# Patient Record
Sex: FEMALE | HISPANIC OR LATINO
[De-identification: names, ages, dates, MRNs, and addresses within clinical notes are randomized per-mention and may not be internally consistent; named-entity substitution may affect disease eponyms.]

---

## 2017-04-09 ENCOUNTER — HOSPITAL (OUTPATIENT)
Dept: OTHER | Age: 22
End: 2017-04-09
Attending: EMERGENCY MEDICINE

## 2017-09-05 ENCOUNTER — HOSPITAL (OUTPATIENT)
Dept: OTHER | Age: 22
End: 2017-09-05

## 2017-09-05 LAB
ALBUMIN SERPL-MCNC: 4.1 GM/DL (ref 3.6–5.1)
ALBUMIN/GLOB SERPL: 1.1 {RATIO} (ref 1–2.4)
ALP SERPL-CCNC: 88 UNIT/L (ref 45–117)
ALT SERPL-CCNC: 14 UNIT/L
ANALYZER ANC (IANC): NORMAL
ANION GAP SERPL CALC-SCNC: 9 MMOL/L (ref 10–20)
AST SERPL-CCNC: 18 UNIT/L
BASOPHILS # BLD: 0 THOUSAND/MCL (ref 0–0.3)
BASOPHILS NFR BLD: 0 %
BILIRUB SERPL-MCNC: 0.7 MG/DL (ref 0.2–1)
BUN SERPL-MCNC: 13 MG/DL (ref 6–20)
BUN/CREAT SERPL: 18 (ref 7–25)
CALCIUM SERPL-MCNC: 9.6 MG/DL (ref 8.4–10.2)
CHLORIDE: 105 MMOL/L (ref 98–107)
CO2 SERPL-SCNC: 29 MMOL/L (ref 21–32)
CREAT SERPL-MCNC: 0.71 MG/DL (ref 0.51–0.95)
DIFFERENTIAL METHOD BLD: NORMAL
EOSINOPHIL # BLD: 0.1 THOUSAND/MCL (ref 0.1–0.5)
EOSINOPHIL NFR BLD: 1 %
ERYTHROCYTE [DISTWIDTH] IN BLOOD: 12.5 % (ref 11–15)
GLOBULIN SER-MCNC: 3.8 GM/DL (ref 2–4)
GLUCOSE SERPL-MCNC: 84 MG/DL (ref 65–99)
HCG POINT OF CARE (5HGRST): NEGATIVE
HEMATOCRIT: 41.6 % (ref 36–46.5)
HGB BLD-MCNC: 14 GM/DL (ref 12–15.5)
LYMPHOCYTES # BLD: 2 THOUSAND/MCL (ref 1.2–5.2)
LYMPHOCYTES NFR BLD: 22 %
MCH RBC QN AUTO: 30.2 PG (ref 26–34)
MCHC RBC AUTO-ENTMCNC: 33.7 GM/DL (ref 32–36.5)
MCV RBC AUTO: 89.7 FL (ref 78–100)
MONOCYTES # BLD: 0.5 THOUSAND/MCL (ref 0.3–0.9)
MONOCYTES NFR BLD: 5 %
NEUTROPHILS # BLD: 6.8 THOUSAND/MCL (ref 1.8–8)
NEUTROPHILS NFR BLD: 72 %
NEUTS SEG NFR BLD: NORMAL %
PERCENT NRBC: NORMAL
PLATELET # BLD: 264 THOUSAND/MCL (ref 140–450)
POTASSIUM SERPL-SCNC: 4 MMOL/L (ref 3.4–5.1)
PROT SERPL-MCNC: 7.9 GM/DL (ref 6.4–8.2)
RBC # BLD: 4.64 MILLION/MCL (ref 4–5.2)
SODIUM SERPL-SCNC: 139 MMOL/L (ref 135–145)
WBC # BLD: 9.5 THOUSAND/MCL (ref 4.2–11)

## 2017-11-26 ENCOUNTER — HOSPITAL ENCOUNTER (OUTPATIENT)
Age: 22
Discharge: HOME | End: 2017-11-26
Attending: FAMILY MEDICINE
Payer: COMMERCIAL

## 2017-11-26 VITALS
HEIGHT: 64 IN | DIASTOLIC BLOOD PRESSURE: 67 MMHG | SYSTOLIC BLOOD PRESSURE: 120 MMHG | HEART RATE: 88 BPM | OXYGEN SATURATION: 100 % | BODY MASS INDEX: 21.17 KG/M2 | WEIGHT: 124 LBS | TEMPERATURE: 99 F | RESPIRATION RATE: 16 BRPM

## 2017-11-26 DIAGNOSIS — J01.00 ACUTE NON-RECURRENT MAXILLARY SINUSITIS: Primary | ICD-10-CM

## 2017-11-26 DIAGNOSIS — R07.0 PAIN IN THROAT: ICD-10-CM

## 2017-11-26 PROCEDURE — 87430 STREP A AG IA: CPT

## 2017-11-26 PROCEDURE — 99203 OFFICE O/P NEW LOW 30 MIN: CPT

## 2017-11-26 PROCEDURE — 99204 OFFICE O/P NEW MOD 45 MIN: CPT

## 2017-11-26 RX ORDER — ALBUTEROL SULFATE 90 UG/1
2 AEROSOL, METERED RESPIRATORY (INHALATION) EVERY 4 HOURS PRN
Qty: 1 INHALER | Refills: 0 | Status: SHIPPED | OUTPATIENT
Start: 2017-11-26 | End: 2017-11-26

## 2017-11-26 RX ORDER — AMOXICILLIN AND CLAVULANATE POTASSIUM 875; 125 MG/1; MG/1
1 TABLET, FILM COATED ORAL 2 TIMES DAILY
Qty: 20 TABLET | Refills: 0 | Status: SHIPPED | OUTPATIENT
Start: 2017-11-26 | End: 2017-11-26

## 2017-11-26 RX ORDER — AMOXICILLIN AND CLAVULANATE POTASSIUM 875; 125 MG/1; MG/1
1 TABLET, FILM COATED ORAL 2 TIMES DAILY
Qty: 20 TABLET | Refills: 0 | Status: SHIPPED | OUTPATIENT
Start: 2017-11-26 | End: 2017-12-06

## 2017-11-26 RX ORDER — ALBUTEROL SULFATE 90 UG/1
2 AEROSOL, METERED RESPIRATORY (INHALATION) EVERY 4 HOURS PRN
Qty: 1 INHALER | Refills: 0 | Status: SHIPPED | OUTPATIENT
Start: 2017-11-26 | End: 2017-12-26

## 2017-11-26 NOTE — ED PROVIDER NOTES
Patient Seen in: [address]    History   Patient presents with:  Sore Throat  Cough/URI    Stated Complaint: sore throat    HPI    33year old patient with no significant PMHx presents with nasal congestion and cough for 4 Device: None (Room air)    Current:/67   Pulse 88   Temp 98.5 °F (36.9 °C) (Oral)   Resp 16   Ht 162.6 cm (5' 4\")   Wt 56.2 kg   LMP 11/18/2017   SpO2 100%   BMI 21.28 kg/m²     GENERAL: NAD, well hydrated, no stridor, appears comfortable  EYES: ani MCG/ACT Inhalation Aero Soln  Inhale 2 puffs into the lungs every 4 (four) hours as needed for Wheezing or Shortness of Breath.   Qty: 1 Inhaler Refills: 0

## 2017-11-26 NOTE — ED NOTES
All orders complete pt leaving IC stable no acute distress noted.  Pt verbalizes DC and follow up instructions

## 2017-12-20 ENCOUNTER — HOSPITAL ENCOUNTER (OUTPATIENT)
Age: 22
Discharge: HOME | End: 2017-12-20
Attending: FAMILY MEDICINE
Payer: COMMERCIAL

## 2017-12-20 ENCOUNTER — APPOINTMENT (OUTPATIENT)
Dept: GENERAL RADIOLOGY | Age: 22
End: 2017-12-20
Attending: FAMILY MEDICINE
Payer: COMMERCIAL

## 2017-12-20 VITALS
SYSTOLIC BLOOD PRESSURE: 113 MMHG | OXYGEN SATURATION: 99 % | BODY MASS INDEX: 21.34 KG/M2 | RESPIRATION RATE: 12 BRPM | HEIGHT: 64 IN | HEART RATE: 85 BPM | DIASTOLIC BLOOD PRESSURE: 72 MMHG | TEMPERATURE: 98 F | WEIGHT: 125 LBS

## 2017-12-20 DIAGNOSIS — R05.9 COUGH: ICD-10-CM

## 2017-12-20 DIAGNOSIS — R07.0 THROAT PAIN: Primary | ICD-10-CM

## 2017-12-20 PROCEDURE — 99213 OFFICE O/P EST LOW 20 MIN: CPT

## 2017-12-20 PROCEDURE — 70360 X-RAY EXAM OF NECK: CPT | Performed by: FAMILY MEDICINE

## 2017-12-20 PROCEDURE — 99214 OFFICE O/P EST MOD 30 MIN: CPT

## 2017-12-20 PROCEDURE — 87430 STREP A AG IA: CPT

## 2017-12-20 RX ORDER — PROMETHAZINE HYDROCHLORIDE AND CODEINE PHOSPHATE 6.25; 1 MG/5ML; MG/5ML
5 SYRUP ORAL EVERY 6 HOURS PRN
Qty: 120 ML | Refills: 0 | Status: SHIPPED | OUTPATIENT
Start: 2017-12-20 | End: 2018-01-19

## 2017-12-20 NOTE — ED NOTES
Discharge instructions reviewed with patient. Prescription provided. All questions answered to patient's satisfaction.

## 2017-12-20 NOTE — ED PROVIDER NOTES
Patient Seen in: [address]    History   Patient presents with:  Sore Throat    Stated Complaint: sore throat    HPI    Patient here with sore throat for 2 days. No travel,sister is sick contacts .   Patient denies sig s erythema and no tonsillar enlargement, uvula midline, no pointing, no stridor  NECK: supple, no adenopathy, no thyromegaly  LUNGS:  no resp distress, lungs clear bilateral, no rales, rhonchi or wheeze no stridor  CARDIO: RRR without murmur  GI: soft, non-t

## 2017-12-20 NOTE — ED INITIAL ASSESSMENT (HPI)
Patient comes in with cough, congestion, sore throat. She just finished a course of antibiotic but is getting sick again.

## 2018-10-04 ENCOUNTER — HOSPITAL (OUTPATIENT)
Dept: OTHER | Age: 23
End: 2018-10-04

## 2018-10-04 LAB
ALBUMIN SERPL-MCNC: 4 GM/DL (ref 3.6–5.1)
ALBUMIN/GLOB SERPL: 1.1 {RATIO} (ref 1–2.4)
ALP SERPL-CCNC: 89 UNIT/L (ref 45–117)
ALT SERPL-CCNC: 26 UNIT/L
AMORPH SED URNS QL MICRO: ABNORMAL
ANALYZER ANC (IANC): NORMAL
ANION GAP SERPL CALC-SCNC: 12 MMOL/L (ref 10–20)
APPEARANCE UR: ABNORMAL
AST SERPL-CCNC: 23 UNIT/L
BACTERIA #/AREA URNS HPF: ABNORMAL /HPF
BASOPHILS # BLD: 0 THOUSAND/MCL (ref 0–0.3)
BASOPHILS NFR BLD: 0 %
BILIRUB SERPL-MCNC: 0.3 MG/DL (ref 0.2–1)
BILIRUB UR QL: NEGATIVE
BUN SERPL-MCNC: 8 MG/DL (ref 6–20)
BUN/CREAT SERPL: 10 (ref 7–25)
C TRACH RRNA SPEC QL NAA+PROBE: NEGATIVE
CALCIUM SERPL-MCNC: 9.2 MG/DL (ref 8.4–10.2)
CAOX CRY URNS QL MICRO: ABNORMAL
CHLORIDE: 105 MMOL/L (ref 98–107)
CLUE CELLS SPEC QL WET PREP: PRESENT
CO2 SERPL-SCNC: 29 MMOL/L (ref 21–32)
COLOR UR: YELLOW
CREAT SERPL-MCNC: 0.78 MG/DL (ref 0.51–0.95)
DIFFERENTIAL METHOD BLD: NORMAL
EOSINOPHIL # BLD: 0.1 THOUSAND/MCL (ref 0.1–0.5)
EOSINOPHIL NFR BLD: 2 %
EPITH CASTS #/AREA URNS LPF: ABNORMAL /[LPF]
ERYTHROCYTE [DISTWIDTH] IN BLOOD: 12.7 % (ref 11–15)
FATTY CASTS #/AREA URNS LPF: ABNORMAL /[LPF]
GLOBULIN SER-MCNC: 3.8 GM/DL (ref 2–4)
GLUCOSE SERPL-MCNC: 95 MG/DL (ref 65–99)
GLUCOSE UR-MCNC: NEGATIVE MG/DL
GRAN CASTS #/AREA URNS LPF: ABNORMAL /[LPF]
HCG POINT OF CARE (5HGRST): NEGATIVE
HEMATOCRIT: 41.2 % (ref 36–46.5)
HGB BLD-MCNC: 13.7 GM/DL (ref 12–15.5)
HGB UR QL: ABNORMAL
HYALINE CASTS #/AREA URNS LPF: ABNORMAL /[LPF]
KETONES UR-MCNC: NEGATIVE MG/DL
LEUKOCYTE ESTERASE UR QL STRIP: NEGATIVE
LIPASE SERPL-CCNC: 152 UNIT/L (ref 73–393)
LYMPHOCYTES # BLD: 2.1 THOUSAND/MCL (ref 1–4.8)
LYMPHOCYTES NFR BLD: 25 %
MCH RBC QN AUTO: 29.7 PG (ref 26–34)
MCHC RBC AUTO-ENTMCNC: 33.3 GM/DL (ref 32–36.5)
MCV RBC AUTO: 89.4 FL (ref 78–100)
MIXED CELL CASTS #/AREA URNS LPF: ABNORMAL /[LPF]
MONOCYTES # BLD: 0.5 THOUSAND/MCL (ref 0.3–0.9)
MONOCYTES NFR BLD: 5 %
MUCOUS THREADS URNS QL MICRO: PRESENT
N GONORRHOEA RRNA SPEC QL NAA+PROBE: NEGATIVE
NEUTROPHILS # BLD: 5.9 THOUSAND/MCL (ref 1.8–7.7)
NEUTROPHILS NFR BLD: 68 %
NEUTS SEG NFR BLD: NORMAL %
NITRITE UR QL: NEGATIVE
NRBC (NRBCRE): NORMAL
PH UR: 5 UNIT (ref 5–7)
PLATELET # BLD: 258 THOUSAND/MCL (ref 140–450)
POTASSIUM SERPL-SCNC: 3.9 MMOL/L (ref 3.4–5.1)
PROT SERPL-MCNC: 7.8 GM/DL (ref 6.4–8.2)
PROT UR QL: NEGATIVE MG/DL
RBC # BLD: 4.61 MILLION/MCL (ref 4–5.2)
RBC #/AREA URNS HPF: ABNORMAL /HPF (ref 0–3)
RBC CASTS #/AREA URNS LPF: ABNORMAL /[LPF]
RENAL EPI CELLS #/AREA URNS HPF: ABNORMAL /[HPF]
SODIUM SERPL-SCNC: 142 MMOL/L (ref 135–145)
SP GR UR: 1.01 (ref 1–1.03)
SPECIMEN SOURCE: ABNORMAL
SPECIMEN SOURCE: NORMAL
SPERM URNS QL MICRO: ABNORMAL
SQUAMOUS #/AREA URNS HPF: ABNORMAL /HPF (ref 0–5)
T VAGINALIS SPEC QL WET PREP: ABNORMAL
T VAGINALIS URNS QL MICRO: ABNORMAL
TRI-PHOS CRY URNS QL MICRO: ABNORMAL
URATE CRY URNS QL MICRO: ABNORMAL
URINE REFLEX: ABNORMAL
URNS CMNT MICRO: ABNORMAL
UROBILINOGEN UR QL: 0.2 MG/DL (ref 0–1)
WAXY CASTS #/AREA URNS LPF: ABNORMAL /[LPF]
WBC # BLD: 8.6 THOUSAND/MCL (ref 4.2–11)
WBC #/AREA URNS HPF: ABNORMAL /HPF (ref 0–5)
WBC CASTS #/AREA URNS LPF: ABNORMAL /[LPF]
YEAST HYPHAE URNS QL MICRO: ABNORMAL
YEAST SPEC QL WET PREP: ABNORMAL
YEAST URNS QL MICRO: ABNORMAL

## 2019-06-21 ENCOUNTER — TELEPHONE (OUTPATIENT)
Dept: SCHEDULING | Age: 24
End: 2019-06-21

## 2022-01-19 NOTE — ED INITIAL ASSESSMENT (HPI)
[facility]   Physical Therapy Treatment Note    Name: Mita Nicole  Clinic Number: [id number]    Therapy Diagnosis:   Encounter Diagnoses   Name Primary?    Decreased ROM of lumbar spine     Decreased strength of lower extremity     Decreased functional activity tolerance      Physician: Ulisses Hussein MD    Visit Date: 1/20/2022    Physician Orders: PT Eval and Treat  Medical Diagnosis from Referral: M54.50,G89.29 (ICD-10-CM) - Chronic bilateral low back pain without sciatica  Evaluation Date: 10/26/2021  Authorization Period Expiration: 6/1/22  Plan of Care Expiration: 2/18/22  Progress Note Due: 1/21/22  Visit # / Visits authorized: 5 / 20 (14) (1 / 1 Eval) (re-assessed on 11/23/21)(re-assessed on 12/21/21)  FOTO: 4 / 4 (10/26/21 - IE, 11/9/21, 11/23/21, 12/21/21)   PTA Visit #: 0 / 5     Precautions: Essential HTN    Time In: 1:35 PM  Time Out: 2:20 PM  Total Billable Time: 45 minutes    SUBJECTIVE     Pt reports: that she is continuing to feel better and the instances of her symptoms occurring has decreased since last time.    She was compliant with home exercise program.  Response to previous treatment: no soreness or increase in pain  Functional change: symptoms are not as constant, able to perform housework with greater ease    Pain: 0/10 (more discomfort)  Location: left back    OBJECTIVE     Objective Measures updated at progress report unless specified.     Treatment     Yael received the treatments listed below:      Therapeutic exercises to develop strength, endurance, ROM, flexibility, posture and core stabilization for 35 minutes including:      Push/Pull Technique (correction of L anteriorly rotated innominate) 3x3s (1 set performed throughout treatment this date)  Shuttle Press x2 min BLE (1 black cord, 1 red cord)   Shuttle Press x1 min ULE (1 black cord, 1 red cord)    Shuttle Press SL 2x10 reps B (1 red cord)   Shuttle Press Heel Raises x1 min (1 black  Per pt having sore throat pain with swallowing and hoarseness. Pt reports post nasal drip and cough. Also reports chills denies fevers symptoms since Thursday. cord, 1 red cord)  Standing Gastroc Stretch 10x10s (slant board level 3)  Standing Hip Extensions x15 reps BLE (YTB) around ankles  Standing Hip Abduction x15 reps BLE (YTB) around ankles  Standing TA Contractions into SB 2x10 reps   SLS + Re-bounder Toss x15 reps + blue foam pad (red med ball) BLE (forwards   Side Steps + YTB around ankles x1 lap in crosswalk  Standing Hip Abduction Isometrics with ball on wall x15 reps (knee flexed to 90)  Mini Squats against SB on wall 2x10 reps (NP today)    Seated:  SB Roll Outs x2 min  Seated Lumbar Flexion x10 reps B    Seated Hamstring Stretch 5x10s B (NP - done in long sitting)  Seated Figure 4 Stretch 5x10s (LLE only) (NP today - done in supine)  Long sitting hamstring stretch 5 x10s B (NP today - performed in standing)  Butterfly stretch 10s x10 (NP today)    Supine:  Pelvic Tilts (3 sec holds) with LE Marching 2x10 reps + BTB  Hip Bridge - 2 up 1 down x5 reps BLE (NP today)  SL LE Stretch with leg extended 10x10s BLE    Figure 4 stretch 10 x10s B (NP today)  Piriformis stretch 10 x10s B (NP today)  Hip Bridge 2 x10 reps + Hip Abduction + RTB around knees (NP today)  Windshield Wipers with LE's starting at 90/90 5x10s B (NP today)  Hamstring Stretch with strap 3x10s BLE (NP today - long sitting)  LTR with SB x1 min (NP today)  DKTC with SB x1 min (NP today)    Standing: (NP today)  ?Diagonal Pulls - D2 pattern x10 reps B RTB  Paloff Press 2x10 reps B + tandem stance + RTB  Triangle pose 10x5s each (hip adduction stretch + lat stretch via UE ) (NP today)  [place] Stretch x10 reps BLE (5-10 sec holds) (modified on table)  Standing Hamstring Stretch x5 reps (alternating LE's knee bend/straight)  Standing Hip Adduction Stretch 5x10s B (NP - triangle pose)  Standing Lat Stretch 5x10s (NP - triangle pose)    Side-Lying: (NP today)  Hip Abduction 2x10 reps B  Reverse Clamshells + ball between knees 2x10 reps B  Open Books x10 reps B (NP today)  Thoracic/Lat Stretch with UE  Abduction x10 reps (3 sec holds) B (pillow between knees) (NP today)  Hip Adduction 2x10 reps B (NP today)    Prone: (NP today)  Hip extensions 2x10 reps B (NP today)  Hip Extension with knee bent x10 reps B (NP today)    Quadruped:(NP today)  Alternating LE's 2x10 reps B  Pelvic Tilts 2x10 reps - neutral position, avoid thoracic rounding (NP today)  Alternating Arm and Leg 2x10 reps (NP today)  Fire hydrants x10 reps B (NP today)  Child's Pose (3 directions) 3x10s (NP today)     Possible for Next Session: supine alternating arm and legs      Patient received Manual Therapy techniques in the form of soft tissue mobilization 00 minutes. This was applied to the L superior glute and lumbar paraspinals. (NP today)    Percussion Gun to L superior glute and lumbar paraspinals (patient in side-lying with affected glue  Hip Joint Distraction (L only) (NP today)      Patient received IFC-electrical stimulation to the L lumbar paraspinals 10 minutes. 100% frequency with intensity increased to patient tolerance. No adverse reactions noted - patient was cleared of all contraindications prior to use of modality.     Patient received a hot pack to the lumbar paraspinals in combination with IFC-electrical stimulation 10 minutes. Patient supine with LE's elevated on small bolster for comfort.       Patient Education and Home Exercises     Home Exercises Provided and Patient Education Provided     Education provided:   - HEP Review  - Post Exercise Soreness - especially with DN  - Maintaining a pain free ROM with activity  - Anatomy/Physiology of the Lumbar Spine and the surrounding musculature    Written Home Exercises Provided: Patient instructed to cont prior HEP. Exercises were reviewed and Yael was able to demonstrate them prior to the end of the session.  Yael demonstrated good  understanding of the education provided. See EMR under Patient Instructions for exercises provided during therapy sessions    ASSESSMENT     Patient  continues to feel improvements with decreased pain/symptoms. Introduced standing hip abduction isometrics for increased glute strengthening and pelvic stability. Able to progress reps and or intensity of some exercises with good tolerance. Progressed balance by challenging the standing surface - increased LE support required via the contralateral limb. Plan to re-assess patient next session.    Yael is progressing well towards her goals.   Pt prognosis is Good.     Pt will continue to benefit from skilled outpatient physical therapy to address the deficits listed in the problem list box on initial evaluation, provide pt/family education and to maximize pt's level of independence in the home and community environment.     Pt's spiritual, cultural and educational needs considered and pt agreeable to plan of care and goals.     Anticipated barriers to physical therapy: none stated    Goals:   Short Term Goals: 4 weeks   - Patient will demonstrate improved lumbar spine flexion ROM, by at least 25% with minimal to no exacerbation of symptoms for improved functional mobility and increased tolerance to ADL's. (MET: 11/23/21)  - Patient will demonstrate increased LE strength, especially into hip abduction, by at least 1/2 grade via MMT for increased stability and support surrounding the lumbar spine and pelvic girdle. (MET: 11/23/21)  - Patient will be able to sleep on her side without pain or discomfort for improved sleep habits. (progressing, not met)  - Patient will be able to ambulate at least 1 mile with minimal to no discomfort for improved ability to perform physical fitness activities. (progressing, not met)  - Patient will be able to bend over and lift at least 5 pounds from the floor to waist height for increased ability to perform leisure activities such as gardening. (MET: 12/21/21)     Long Term Goals: 8 weeks   - Patient will demonstrate improved lumbar spine rotation ROM with minimal to no exacerbation of  symptoms for improved functional mobility and increased tolerance to ADL's. (MET: 11/23/21)  - Patient will demonstrate increased LE strength, especially into hip flexion and extension, by at least 1/2 grade via MMT for increased stability and support surrounding the lumbar spine and pelvic girdle. (MET: 12/21/21)  - Patient will be able to ambulate at least 2 miles with minimal to no discomfort for improved ability to perform physical fitness activities. (progressing, not met)  - Patient will be able to bend over and lift at least 10 pounds from the floor to waist height for increased ability to perform leisure activities such as gardening. (progressing, not met)  - Patient will demonstrate independence with HEP for continued improvements outside the clinical settings. (MET: 11/23/21)    PLAN     Continue with established POC for improved functional mobility and return to PLOF.    Possible for Next Session: supine alternating arm and legs    Lisa Ascencio, PT, DPT, Cert. DN

## (undated) NOTE — LETTER
12/21/2017  [address] [facility] [facility] [address]     According to our records, it is indicated that you visited one of our [facility] clinics; please follow up with your primary care physician.  If you are in need of a primary care d

## (undated) NOTE — LETTER
[address]  [address] [address] [id number]  Dept: [phone]      November 26, 2017    Patient: Marvin Trejo   Date of Visit: 11/26/2017       To Whom It May Concern:    Sudarshan Charles was seen and treated in o